# Patient Record
Sex: MALE | Race: WHITE
[De-identification: names, ages, dates, MRNs, and addresses within clinical notes are randomized per-mention and may not be internally consistent; named-entity substitution may affect disease eponyms.]

---

## 2017-02-08 ENCOUNTER — HOSPITAL ENCOUNTER (EMERGENCY)
Dept: HOSPITAL 25 - UCCORT | Age: 22
Discharge: HOME | End: 2017-02-08
Payer: COMMERCIAL

## 2017-02-08 VITALS — SYSTOLIC BLOOD PRESSURE: 123 MMHG | DIASTOLIC BLOOD PRESSURE: 84 MMHG

## 2017-02-08 DIAGNOSIS — H10.31: Primary | ICD-10-CM

## 2017-02-08 PROCEDURE — G0463 HOSPITAL OUTPT CLINIC VISIT: HCPCS

## 2017-02-08 PROCEDURE — 99212 OFFICE O/P EST SF 10 MIN: CPT

## 2017-02-08 NOTE — UC
Eye Complaint HPI





- HPI Summary


HPI Summary: 





redness, pain, tearing right eye today. No injury. No contacts. Mild viral URI 

symtpoms recently. No chemical exposures. Had pinkeye once before, it wasn't 

this painful. Very photophobic. Pain/pressure behind eyeball.





- History of Current Complaint


Chief Complaint: UCEye


Stated Complaint: RIGHT EYE COMPLAINT


Time Seen by Provider: 02/08/17 12:35


Hx Obtained From: Patient


Onset/Duration: Gradual Onset, Lasting Days - 1; started last night, felt 

mildly gritty, then red and painful this AM. No crusting


Timing: Constant


Severity Initially: Mild


Severity Currently: Moderate


Location of Injury: Conjunctiva


Character: Dull, Throbbing


Aggravating Factor(s): Light, Blinking, Ultraviolet Exposure


Alleviating Factor(s): Nothing


Associated Signs And Symptoms: Positive: Photophobia, Drainage (Clear).  

Negative: Vision Impairment Right, Fever, Swelling





- Risk Factors


Penetrating Injury Risk Factor: Negative


Globe Rupture Risk Factors: Negative


Acute Glaucoma Risk Factors: Eye Inflammation


Optic Artery Occlusion Risk Factors: Negative





- Allergies/Home Medications


Allergies/Adverse Reactions: 


 Allergies











Allergy/AdvReac Type Severity Reaction Status Date / Time


 


No Known Allergies Allergy   Verified 02/08/17 11:25














PMH/Surg Hx/FS Hx/Imm Hx


Previously Healthy: Yes





- Surgical History


Surgical History: Yes


Surgery Procedure, Year, and Place: Left elbow dislocated.  Laceration over 

right eye.  right elbow dislocation





- Family History


Known Family History: Positive: Other - no eye abnormalities





- Social History


Occupation: Student


Lives: Alone - dorm


Alcohol Use: Occasionally


Substance Use Type: None


Smoking Status (MU): Never Smoked Tobacco





Review of Systems


Constitutional: Negative


Skin: Negative


Eyes: Drainage, Eye Redness, Photophobia


ENT: Nasal Discharge


Respiratory: Cough - mild


Cardiovascular: Negative


Gastrointestinal: Negative


Genitourinary: Negative


Motor: Negative


Neurovascular: Negative


Musculoskeletal: Negative


Neurological: Negative


Psychological: Negative


All Other Systems Reviewed And Are Negative: Yes





Physical Exam


Triage Information Reviewed: Yes


Appearance: Well-Appearing, No Pain Distress, Well-Nourished


Vital Signs: 


 Initial Vital Signs











Temp  98.6 F   02/08/17 11:19


 


Pulse  89   02/08/17 11:19


 


Resp  14   02/08/17 11:19


 


BP  123/84   02/08/17 11:19


 


Pulse Ox  96   02/08/17 11:19











Vital Signs Reviewed: Yes


Eyes: Positive: Conjunctiva Inflamed - right eye.  Negative: Discharge


ENT Exam: Normal


ENT: Positive: Pharynx normal, TMs normal.  Negative: Tonsillar swelling, 

Trismus, Muffled/hoarse voice


Neck exam: Normal


Neck: Positive: Supple


Respiratory Exam: Normal


Cardiovascular Exam: Normal


Musculoskeletal Exam: Normal


Neurological Exam: Normal


Psychological Exam: Normal


Skin Exam: Normal





Eye Complaint Course/Dx





- Course


Course Of Treatment: advised follow up with Ophthalmology if not better tomorrow





- Differential Dx/Diagnosis


Differential Diagnosis/HQI/PQRI: Conjunctivitis, Keratitis, Uveitis


Provider Diagnoses: conjunctivitis





Discharge





- Discharge Plan


Condition: Stable


Disposition: HOME


Prescriptions: 


Ciprofloxacin 0.3% OPTH.SOL* [Cipro 0.3% Opth*] 1 drop BOTH EYES Q4H #1 btl


HYDROcodone/ACETAMIN 5-325 MG* [Norco 5-325 TAB*] 1 - 2 tab PO Q6H PRN #10 tab 

MDD 6 tab


 PRN Reason: Pain


Patient Education Materials:  Conjunctivitis (ED)


Forms:  *School Release


Referrals: 


Filomena Nicholas MD [Medical Doctor] - 


Non Staff,Doctor [Primary Care Provider] - 


Additional Instructions: 


If your eye is not improving in the next 24 hours, contact Dr. Nicholas (

Ophthalmologist)


Cold compresses to the eye


Rest in a dark room, wear sunglasses when outside


Put the antibiotic drops in BOTH eyes every four hours.

## 2017-05-02 ENCOUNTER — HOSPITAL ENCOUNTER (EMERGENCY)
Dept: HOSPITAL 25 - UCCORT | Age: 22
Discharge: HOME | End: 2017-05-02
Payer: COMMERCIAL

## 2017-05-02 VITALS — DIASTOLIC BLOOD PRESSURE: 79 MMHG | SYSTOLIC BLOOD PRESSURE: 134 MMHG

## 2017-05-02 DIAGNOSIS — J03.90: Primary | ICD-10-CM

## 2017-05-02 DIAGNOSIS — R50.9: ICD-10-CM

## 2017-05-02 DIAGNOSIS — R59.1: ICD-10-CM

## 2017-05-02 LAB
EBV RESPONSE: NO
MANUAL ENTRY VERIFICATION: (no result)
MONO INTERNAL CONTROL: (no result)
MONO KIT LOT#: (no result)

## 2017-05-02 PROCEDURE — 36415 COLL VENOUS BLD VENIPUNCTURE: CPT

## 2017-05-02 PROCEDURE — 96372 THER/PROPH/DIAG INJ SC/IM: CPT

## 2017-05-02 PROCEDURE — 87651 STREP A DNA AMP PROBE: CPT

## 2017-05-02 PROCEDURE — G0463 HOSPITAL OUTPT CLINIC VISIT: HCPCS

## 2017-05-02 PROCEDURE — 86308 HETEROPHILE ANTIBODY SCREEN: CPT

## 2017-05-02 PROCEDURE — 99212 OFFICE O/P EST SF 10 MIN: CPT

## 2017-05-02 NOTE — UC
Throat Pain/Nasal Carlos HPI





- HPI Summary


HPI Summary: 


had a sudden onset of sore throat, dysphagia, fever, HA and chills, bodyaches.





- History of Current Complaint


Chief Complaint: UCGeneralIllness


Stated Complaint: SORE THROAT,CHILLS


Time Seen by Provider: 05/02/17 10:14


Hx Obtained From: Patient


Onset/Duration: Sudden Onset, Lasting Days


Severity: Severe


Associated Signs & Symptoms: Positive: Dysphagia, Hoarseness, Fever





- Epiglottits Risk Factors


Epiglottis Risk Factors: Negative





- Allergies/Home Medications


Allergies/Adverse Reactions: 


 Allergies











Allergy/AdvReac Type Severity Reaction Status Date / Time


 


No Known Allergies Allergy   Verified 05/02/17 10:10











Home Medications: 


 Home Medications





Phenylephrine-Doxylamine-Dextr [Nyquil Severe Cold/Flu 5-6.25- mg/15Ml] 1 

teasp PO DAILY 05/02/17 [History Confirmed 05/02/17]











PMH/Surg Hx/FS Hx/Imm Hx


Previously Healthy: Yes





- Surgical History


Surgical History: Yes


Surgery Procedure, Year, and Place: Left elbow dislocated.  Laceration over 

right eye.  right elbow dislocation





- Family History


Known Family History: Positive: Other - no eye abnormalities


   Negative: Cardiac Disease, Hypertension





- Social History


Alcohol Use: Occasionally


Substance Use Type: None


Smoking Status (MU): Never Smoked Tobacco





Review of Systems


Constitutional: Fever, Chills, Fatigue


Skin: Negative


Eyes: Negative


ENT: Sore Throat, Nasal Discharge


Respiratory: Cough


Cardiovascular: Negative


Gastrointestinal: Negative


Genitourinary: Negative


Motor: Negative


Neurovascular: Negative


Musculoskeletal: Negative


Neurological: Headache


Psychological: Negative


All Other Systems Reviewed And Are Negative: Yes





Physical Exam


Triage Information Reviewed: Yes


Appearance: Well-Nourished, Ill-Appearing, Pain Distress


Vital Signs: 


 Initial Vital Signs











Temp  100.2 F   05/02/17 10:06


 


Pulse  112   05/02/17 10:06


 


Resp  18   05/02/17 10:06


 


BP  134/79   05/02/17 10:06


 


Pulse Ox  97   05/02/17 10:06











Vital Signs Reviewed: Yes


Eye Exam: Normal


Eyes: Positive: Conjunctiva Clear


ENT: Positive: Pharyngeal erythema, Nasal congestion, TM red, Tonsillar 

swelling - +4, Tonsillar exudate - on right tonsil


Dental Exam: Normal


Neck exam: Normal


Neck: Positive: Supple, Nontender, Enlarged Nodes @ - bilateral cervical


Respiratory Exam: Normal


Respiratory: Positive: Chest non-tender, Lungs clear, Normal breath sounds


Cardiovascular Exam: Normal


Cardiovascular: Positive: No Murmur, Pulses Normal, Tachycardia


Abdominal Exam: Normal


Abdomen Description: Positive: Nontender, No Organomegaly, Soft


Bowel Sounds: Positive: Present


Musculoskeletal Exam: Normal


Musculoskeletal: Positive: Strength Intact, ROM Intact, No Edema


Neurological Exam: Normal


Neurological: Positive: Alert, Muscle Tone Normal


Psychological Exam: Normal


Skin Exam: Normal





Throat Pain/Nasal Course/Dx





- Course


Course Of Treatment: Hx obtained, exam performed, rapid strep obtained and neg, 

patient given solumedrol for reduction of inflammation of pharynx and tonsils. 

patient did take advil this morning, refused anything for pain. Monospot was 

obtained, ABX started prednisone prescribed. follow up with lab work.





- Differential Dx/Diagnosis


Differential Diagnosis/HQI/PQRI: Influenza, Laryngitis, Mononucleosis, Otitis 

Media, Pharyngitis, Sinusitis, URI


Provider Diagnoses: tonsillitis.  lymphadenopathy.  fever.  possible mono





Discharge





- Discharge Plan


Condition: Stable


Disposition: HOME


Prescriptions: 


Cephalexin CAP* [Keflex CAP*] 500 mg PO BID #20 cap


predniSONE TAB* [Deltasone TAB*] 40 mg PO DAILY #14 tab


Patient Education Materials:  Lymphadenopathy (ED)


Additional Instructions: 


1. Increase your fluid intake


2. Get plenty of rest


3. Take the medication as prescribed.


4. Lab work will be available tomorrow, will contact you with positive results. 

If you do not here from us finish your medications.

## 2018-10-27 ENCOUNTER — HOSPITAL ENCOUNTER (EMERGENCY)
Dept: HOSPITAL 25 - UCCORT | Age: 23
Discharge: HOME | End: 2018-10-27
Payer: COMMERCIAL

## 2018-10-27 VITALS — SYSTOLIC BLOOD PRESSURE: 136 MMHG | DIASTOLIC BLOOD PRESSURE: 81 MMHG

## 2018-10-27 DIAGNOSIS — H10.9: Primary | ICD-10-CM

## 2018-10-27 PROCEDURE — 99212 OFFICE O/P EST SF 10 MIN: CPT

## 2018-10-27 PROCEDURE — G0463 HOSPITAL OUTPT CLINIC VISIT: HCPCS

## 2018-10-27 NOTE — XMS REPORT
[facility]

 Created on:2018



Patient:MIHIR WHITEHEAD

Sex:Male

:1995

External Reference #:[id number]





Demographics







 Address  [address] [place] [address]

 

 Phone  [phone]

 

 Phone  [phone]

 

 Preferred Language  English

 

 Marital Status  Unknown

 

 Voodoo Affiliation  Unknown

 

 Race  Unknown

 

 Ethnic Group  Unknown









Author







 Organization  [facility]

 

 Address  [facility] [address]



   [place] [address]

 

 Phone  [phone]









Support







 Name  Relationship  Address  Phone

 

 MIHIR WHITEHEAD  Unavailable  [address] [facility] [address]  [phone]



     [place] [address]  

 

 CHARMAINE VASQUEZ  Unavailable  [address]  [phone]



     [place] [address]  









Care Team Providers







 Name  Role  Phone

 

 Modesto Felton  Unavailable  Unavailable









PROBLEMS

Unknown Problems



ALLERGIES

No Known Allergies



ENCOUNTERS







 Encounter  Location  Date  Diagnosis

 

 [facility]  [address]  11 Oct, 2018  Impacted cerumen of left



   [address]    ear H61.22 and



   [address]    Immunization due Z23

 

 [facility]  [address]  15 [place], 2018  Establishing care with new



   [address]    doctor, encounter for



   [address]    Z76.89 and Need for HPV



       vaccine Z23

 

 [facility]  [address]    Acute frontal 
sinusitis,



   [address]    recurrence not specified



   [address]    J01.10 and Sore throat



       J02.9

 

 [facility]  [address]  25 May, 2017  Strep 
pharyngitis J02.0



   [address]    



   [address]    

 

 [facility]  [address]    Left ankle 
injury 959.7 ;



   [facility] [address] [place], [place]    Acute conjunctivitis of



   [address]    both eyes 372.00 ; Place



       of occurrence, home E849.0



       and Striking against or



       struck accidentally by



       objects or persons in



       sports without subsequent



       fall E917.0







IMMUNIZATIONS







 Vaccine  Route  Administration Date  Status

 

 HPV9 0.5mL (Gardasil 9)  IM Intramuscular  Oct 11, 2018  Administered

 

 Influenza (36 months & up) Fluzone  IM Intramuscular  Oct 11, 2018  
Administered







SOCIAL HISTORY

Never Assessed



REASON FOR REFERRAL





FUNCTIONAL STATUS





PLAN OF CARE







 Activity  Details









 









 Follow Up  prn, 3 Months Reason:







VITAL SIGNS







 Weight  249.8 lbs  2018-10-11

 

 Height  71 in  2018-10-11

 

 BMI  34.84 kg/m2  2018-10-11

 

 Heart Rate  84 /min  2018-10-11

 

 Respiratory Rate  18 /min  2018-10-11

 

 Temperature  97.9 degrees Fahrenheit  2018-10-11

 

 Oximetry  97  2018-10-11

 

 Blood pressure systolic  131 mm Hg  2018-10-11

 

 Blood pressure diastolic  72 mm Hg  2018-10-11







MEDICATIONS







 Medication  Instructions  Dosage  Frequency  Start  End  Duration  Status



         Date  Date    

 

 Debrox 6.5 %  Otic Twice a day  5 drops  12h  11 Oct,     day(s)  Active



     into    2018      



     affected          



     right ear          

 

 Flonase 50  Nasally Twice a  1 spray in  12h  ,    30 day(s)  Not-Takin



 MCG/ACT  day  each          g



     nostril          

 

 Augmentin  Orally every 12  1 tablet  12h  ,    10 day(s)  Not-Takin



 875-125 MG  hrs      2017      g

 

 Neti Pot Sinus  Nasally bid  as directed  12,    10 day(s)  Not-Takin



 Wash 2300-700              g



 MG              

 

 Mucinex DM  Orally Twice a  1 tablet as  12h  22 Nov,    10 day(s)  Not-Takin



 Maximum  day  needed          g



 Strength              



  MG              

 

 Sudafed 12  Orally every 12  1 tablet as  12,    10 day(s)  Not-Takin



 Hour 120 MG  hrs  needed          g







PROCEDURES







 Procedure  Date Ordered  Result  Body Site

 

 HPV9 0.5mL (Gardasil 9)  Oct 11, 2018    

 

 IMMUNIZATION ADMIN  Oct 11, 2018    

 

 Influenza (36 months & up)  Oct 11, 2018    

 

 IMMUNIZATION ADMIN EACH ADD  Oct 11, 2018    

 

 EAR IRRIGATION  Oct 11, 2018    







RESULTS

No Results



REASON FOR VISIT

EAR BLOCKAGE



Insurance Providers







 [facility]  Health  Member  Patient  Patient  Patient  
Patient  Patient  Subscriber  Subscriber  Subscriber  Group



 Insurance  Plan  Plan  Plan  Plan  ID  Relationship  Address  Phone  Name  
Date of  ID  Name  Date of  No



 Type  Insurance  Insurance  Insurance  Coverage    to Subscriber        Birth 
     Birth  



   Address  Phone  Name  Dates                    

 

 [employer] [place]  [address]  [phone]  [employer] [place]            MIHIR  [id number]  
[id number]      



 [employer] [address]  [address] [place]  [address]  [employer]            [place]    [address]      



 [id number]    [phone]                      



   [facility] [id number]                          







MEDICAL (GENERAL) HISTORY







 Type  Description  Date

 

 Surgical History  ORIF right thumb

## 2018-10-27 NOTE — UC
Eye Complaint HPI





- HPI Summary


HPI Summary: 





Pt c/o sudden onset of left eye redness, "yellow " discharge that began 1 day 

ago. Pt reports that today he woke with left eye "glued shut". 





- History of Current Complaint


Chief Complaint: UCEye


Stated Complaint: LEFT EYE COMPLAINT


Time Seen by Provider: 10/27/18 13:17


Hx Obtained From: Patient


Onset/Duration: Sudden Onset, Lasting Days, Still Present


Timing: Constant


Severity Initially: Mild


Severity Currently: Mild


Pain Intensity: 0


Aggravating Factor(s): Nothing


Alleviating Factor(s): Nothing


Associated Signs And Symptoms: Positive: Drainage (Purulent)





- Risk Factors


Penetrating Injury Risk Factor: Negative


Acute Glaucoma Risk Factors: Negative


Optic Artery Occlusion Risk Factors: Negative





- Allergies/Home Medications


Allergies/Adverse Reactions: 


 Allergies











Allergy/AdvReac Type Severity Reaction Status Date / Time


 


No Known Allergies Allergy   Verified 10/27/18 12:51














PMH/Surg Hx/FS Hx/Imm Hx


Previously Healthy: Yes





- Surgical History


Surgical History: Yes


Surgery Procedure, Year, and Place: Left elbow dislocated.  Laceration over 

right eye.  right elbow break 2007





- Family History


Known Family History: Positive: Other - no eye abnormalities


   Negative: Cardiac Disease, Hypertension





- Social History


Occupation: Employed Full-time


Lives: With Family


Alcohol Use: Occasionally


Substance Use Type: None


Smoking Status (MU): Never Smoked Tobacco


Have You Smoked in the Last Year: No





Review of Systems


Constitutional: Negative


Skin: Negative


Eyes: Drainage, Eye Redness


ENT: Negative


Respiratory: Negative


Cardiovascular: Negative


Gastrointestinal: Negative


Genitourinary: Negative


Motor: Negative


Neurovascular: Negative


Musculoskeletal: Negative


Neurological: Negative


Psychological: Negative


Is Patient Immunocompromised?: No


All Other Systems Reviewed And Are Negative: Yes





Physical Exam


Triage Information Reviewed: Yes


Appearance: Well-Appearing


Vital Signs: 


 Initial Vital Signs











Temp  98.3 F   10/27/18 12:48


 


Pulse  88   10/27/18 12:48


 


Resp  16   10/27/18 12:48


 


BP  136/81   10/27/18 12:48


 


Pulse Ox  97   10/27/18 12:48











Vital Signs Reviewed: Yes


Eyes: Positive: Conjunctiva Inflamed, Discharge


ENT Exam: Normal


Dental Exam: Normal


Neck exam: Normal


Respiratory Exam: Normal


Cardiovascular Exam: Normal


Musculoskeletal Exam: Normal


Neurological Exam: Normal


Psychological Exam: Normal


Skin Exam: Normal





Eye Complaint Course/Dx





- Differential Dx/Diagnosis


Differential Diagnosis/HQI/PQRI: Conjunctivitis


Provider Diagnoses: conjunctivitis left eye





Discharge





- Sign-Out/Discharge


Documenting (check all that apply): Patient Departure


All imaging exams completed and their final reports reviewed: No Studies





- Discharge Plan


Condition: Stable


Disposition: HOME


Prescriptions: 


Polymyx/Trimethoprim OPTH* [Polytrim OPHTH*] 2 drop BOTH EYES Q6H 7 Days #1 btl


Patient Education Materials:  Conjunctivitis (ED)


Referrals: 


[facility] [Outside] - If Needed


No Primary Care Phys,NOPCP [Primary Care Provider] - 





- Billing Disposition and Condition


Condition: STABLE


Disposition: Home





- Attestation Statements


Provider Attestation: 





I was available for consult. This patient was seen by the ARMANDO. The patient was 

not presented to, seen by, or examined by me. -Griselda